# Patient Record
Sex: FEMALE | Race: WHITE | NOT HISPANIC OR LATINO | URBAN - METROPOLITAN AREA
[De-identification: names, ages, dates, MRNs, and addresses within clinical notes are randomized per-mention and may not be internally consistent; named-entity substitution may affect disease eponyms.]

---

## 2023-08-28 ENCOUNTER — EMERGENCY (EMERGENCY)
Facility: HOSPITAL | Age: 23
LOS: 1 days | Discharge: HOME | End: 2023-08-28
Attending: STUDENT IN AN ORGANIZED HEALTH CARE EDUCATION/TRAINING PROGRAM | Admitting: STUDENT IN AN ORGANIZED HEALTH CARE EDUCATION/TRAINING PROGRAM
Payer: COMMERCIAL

## 2023-08-28 VITALS
RESPIRATION RATE: 16 BRPM | WEIGHT: 164.91 LBS | TEMPERATURE: 100 F | HEART RATE: 125 BPM | OXYGEN SATURATION: 95 % | HEIGHT: 66 IN | SYSTOLIC BLOOD PRESSURE: 110 MMHG | DIASTOLIC BLOOD PRESSURE: 69 MMHG

## 2023-08-28 VITALS
DIASTOLIC BLOOD PRESSURE: 72 MMHG | HEART RATE: 95 BPM | SYSTOLIC BLOOD PRESSURE: 108 MMHG | RESPIRATION RATE: 17 BRPM | TEMPERATURE: 98 F | OXYGEN SATURATION: 98 %

## 2023-08-28 DIAGNOSIS — B27.90 INFECTIOUS MONONUCLEOSIS, UNSPECIFIED WITHOUT COMPLICATION: ICD-10-CM

## 2023-08-28 DIAGNOSIS — N39.0 URINARY TRACT INFECTION, SITE NOT SPECIFIED: ICD-10-CM

## 2023-08-28 DIAGNOSIS — Z20.822 CONTACT WITH AND (SUSPECTED) EXPOSURE TO COVID-19: ICD-10-CM

## 2023-08-28 DIAGNOSIS — R50.9 FEVER, UNSPECIFIED: ICD-10-CM

## 2023-08-28 LAB
ANION GAP SERPL CALC-SCNC: 11 MMOL/L — SIGNIFICANT CHANGE UP (ref 5–17)
ANISOCYTOSIS BLD QL: SLIGHT — SIGNIFICANT CHANGE UP
APPEARANCE UR: CLEAR — SIGNIFICANT CHANGE UP
BACTERIA # UR AUTO: PRESENT /HPF
BASOPHILS # BLD AUTO: 0.09 K/UL — SIGNIFICANT CHANGE UP (ref 0–0.2)
BASOPHILS NFR BLD AUTO: 1.7 % — SIGNIFICANT CHANGE UP (ref 0–2)
BILIRUB UR-MCNC: ABNORMAL
BUN SERPL-MCNC: 6 MG/DL — LOW (ref 7–23)
CALCIUM SERPL-MCNC: 8.9 MG/DL — SIGNIFICANT CHANGE UP (ref 8.4–10.5)
CHLORIDE SERPL-SCNC: 98 MMOL/L — SIGNIFICANT CHANGE UP (ref 96–108)
CO2 SERPL-SCNC: 26 MMOL/L — SIGNIFICANT CHANGE UP (ref 22–31)
COD CRY URNS QL: ABNORMAL /HPF
COLOR SPEC: ABNORMAL
CREAT SERPL-MCNC: 0.54 MG/DL — SIGNIFICANT CHANGE UP (ref 0.5–1.3)
DACRYOCYTES BLD QL SMEAR: SLIGHT — SIGNIFICANT CHANGE UP
DIFF PNL FLD: ABNORMAL
EGFR: 133 ML/MIN/1.73M2 — SIGNIFICANT CHANGE UP
EOSINOPHIL # BLD AUTO: 0.05 K/UL — SIGNIFICANT CHANGE UP (ref 0–0.5)
EOSINOPHIL NFR BLD AUTO: 0.9 % — SIGNIFICANT CHANGE UP (ref 0–6)
EPI CELLS # UR: ABNORMAL /HPF (ref 0–5)
GIANT PLATELETS BLD QL SMEAR: PRESENT — SIGNIFICANT CHANGE UP
GLUCOSE SERPL-MCNC: 123 MG/DL — HIGH (ref 70–99)
GLUCOSE UR QL: 100
HCT VFR BLD CALC: 42 % — SIGNIFICANT CHANGE UP (ref 34.5–45)
HETEROPH AB TITR SER AGGL: POSITIVE
HGB BLD-MCNC: 14.2 G/DL — SIGNIFICANT CHANGE UP (ref 11.5–15.5)
KETONES UR-MCNC: ABNORMAL MG/DL
LACTATE SERPL-SCNC: 1.6 MMOL/L — SIGNIFICANT CHANGE UP (ref 0.5–2)
LEUKOCYTE ESTERASE UR-ACNC: ABNORMAL
LYMPHOCYTES # BLD AUTO: 1.57 K/UL — SIGNIFICANT CHANGE UP (ref 1–3.3)
LYMPHOCYTES # BLD AUTO: 29.2 % — SIGNIFICANT CHANGE UP (ref 13–44)
MACROCYTES BLD QL: SLIGHT — SIGNIFICANT CHANGE UP
MANUAL SMEAR VERIFICATION: SIGNIFICANT CHANGE UP
MCHC RBC-ENTMCNC: 30.3 PG — SIGNIFICANT CHANGE UP (ref 27–34)
MCHC RBC-ENTMCNC: 33.8 GM/DL — SIGNIFICANT CHANGE UP (ref 32–36)
MCV RBC AUTO: 89.6 FL — SIGNIFICANT CHANGE UP (ref 80–100)
MICROCYTES BLD QL: SLIGHT — SIGNIFICANT CHANGE UP
MONOCYTES # BLD AUTO: 0.1 K/UL — SIGNIFICANT CHANGE UP (ref 0–0.9)
MONOCYTES NFR BLD AUTO: 1.8 % — LOW (ref 2–14)
NEUTROPHILS # BLD AUTO: 3.48 K/UL — SIGNIFICANT CHANGE UP (ref 1.8–7.4)
NEUTROPHILS NFR BLD AUTO: 64.6 % — SIGNIFICANT CHANGE UP (ref 43–77)
NITRITE UR-MCNC: POSITIVE
OVALOCYTES BLD QL SMEAR: SLIGHT — SIGNIFICANT CHANGE UP
PH UR: 6.5 — SIGNIFICANT CHANGE UP (ref 5–8)
PLAT MORPH BLD: ABNORMAL
PLATELET # BLD AUTO: 156 K/UL — SIGNIFICANT CHANGE UP (ref 150–400)
POIKILOCYTOSIS BLD QL AUTO: SLIGHT — SIGNIFICANT CHANGE UP
POLYCHROMASIA BLD QL SMEAR: SLIGHT — SIGNIFICANT CHANGE UP
POTASSIUM SERPL-MCNC: 3.6 MMOL/L — SIGNIFICANT CHANGE UP (ref 3.5–5.3)
POTASSIUM SERPL-SCNC: 3.6 MMOL/L — SIGNIFICANT CHANGE UP (ref 3.5–5.3)
PROT UR-MCNC: 100 MG/DL
RAPID RVP RESULT: SIGNIFICANT CHANGE UP
RBC # BLD: 4.69 M/UL — SIGNIFICANT CHANGE UP (ref 3.8–5.2)
RBC # FLD: 12.7 % — SIGNIFICANT CHANGE UP (ref 10.3–14.5)
RBC BLD AUTO: ABNORMAL
RBC CASTS # UR COMP ASSIST: < 5 /HPF — SIGNIFICANT CHANGE UP
SARS-COV-2 RNA SPEC QL NAA+PROBE: SIGNIFICANT CHANGE UP
SMUDGE CELLS # BLD: PRESENT — SIGNIFICANT CHANGE UP
SODIUM SERPL-SCNC: 135 MMOL/L — SIGNIFICANT CHANGE UP (ref 135–145)
SP GR SPEC: 1.02 — SIGNIFICANT CHANGE UP (ref 1–1.03)
SPHEROCYTES BLD QL SMEAR: SLIGHT — SIGNIFICANT CHANGE UP
UROBILINOGEN FLD QL: 4 E.U./DL
VARIANT LYMPHS # BLD: 1.8 % — SIGNIFICANT CHANGE UP (ref 0–6)
WBC # BLD: 5.39 K/UL — SIGNIFICANT CHANGE UP (ref 3.8–10.5)
WBC # FLD AUTO: 5.39 K/UL — SIGNIFICANT CHANGE UP (ref 3.8–10.5)
WBC UR QL: > 10 /HPF

## 2023-08-28 PROCEDURE — 96374 THER/PROPH/DIAG INJ IV PUSH: CPT

## 2023-08-28 PROCEDURE — 87086 URINE CULTURE/COLONY COUNT: CPT

## 2023-08-28 PROCEDURE — 96375 TX/PRO/DX INJ NEW DRUG ADDON: CPT

## 2023-08-28 PROCEDURE — 71046 X-RAY EXAM CHEST 2 VIEWS: CPT | Mod: 26

## 2023-08-28 PROCEDURE — 0225U NFCT DS DNA&RNA 21 SARSCOV2: CPT

## 2023-08-28 PROCEDURE — 85025 COMPLETE CBC W/AUTO DIFF WBC: CPT

## 2023-08-28 PROCEDURE — 36415 COLL VENOUS BLD VENIPUNCTURE: CPT

## 2023-08-28 PROCEDURE — 99284 EMERGENCY DEPT VISIT MOD MDM: CPT | Mod: 25

## 2023-08-28 PROCEDURE — 87040 BLOOD CULTURE FOR BACTERIA: CPT

## 2023-08-28 PROCEDURE — 99285 EMERGENCY DEPT VISIT HI MDM: CPT

## 2023-08-28 PROCEDURE — 83605 ASSAY OF LACTIC ACID: CPT

## 2023-08-28 PROCEDURE — 71046 X-RAY EXAM CHEST 2 VIEWS: CPT

## 2023-08-28 PROCEDURE — 81001 URINALYSIS AUTO W/SCOPE: CPT

## 2023-08-28 PROCEDURE — 86308 HETEROPHILE ANTIBODY SCREEN: CPT

## 2023-08-28 PROCEDURE — 80048 BASIC METABOLIC PNL TOTAL CA: CPT

## 2023-08-28 RX ORDER — CEFTRIAXONE 500 MG/1
1000 INJECTION, POWDER, FOR SOLUTION INTRAMUSCULAR; INTRAVENOUS ONCE
Refills: 0 | Status: COMPLETED | OUTPATIENT
Start: 2023-08-28 | End: 2023-08-28

## 2023-08-28 RX ORDER — SODIUM CHLORIDE 9 MG/ML
1000 INJECTION INTRAMUSCULAR; INTRAVENOUS; SUBCUTANEOUS ONCE
Refills: 0 | Status: COMPLETED | OUTPATIENT
Start: 2023-08-28 | End: 2023-08-28

## 2023-08-28 RX ORDER — CEFPODOXIME PROXETIL 100 MG
1 TABLET ORAL
Qty: 20 | Refills: 0
Start: 2023-08-28 | End: 2023-09-06

## 2023-08-28 RX ADMIN — CEFTRIAXONE 100 MILLIGRAM(S): 500 INJECTION, POWDER, FOR SOLUTION INTRAMUSCULAR; INTRAVENOUS at 18:48

## 2023-08-28 RX ADMIN — SODIUM CHLORIDE 1000 MILLILITER(S): 9 INJECTION INTRAMUSCULAR; INTRAVENOUS; SUBCUTANEOUS at 18:07

## 2023-08-28 NOTE — ED PROVIDER NOTE - PATIENT PORTAL LINK FT
You can access the FollowMyHealth Patient Portal offered by [facility] by registering at the following website: [email or web address]. By joining [facility] FollowMyHealth portal, you will also be able to view your health information using other applications (apps) compatible with our system.

## 2023-08-28 NOTE — ED PROVIDER NOTE - CARE PLAN
Principal Discharge DX:	Fever   1 Principal Discharge DX:	Acute UTI  Secondary Diagnosis:	Infectious mononucleosis

## 2023-08-28 NOTE — ED ADULT TRIAGE NOTE - CHIEF COMPLAINT QUOTE
pt c/o generalized fever, headache, dizzy, lower back pain, sore throat, urinary symtoms since last Wednesday. pt stated " she went to  x 2 was tested for covid, strep with negative result given antibiotics for possible uti, "

## 2023-08-28 NOTE — ED PROVIDER NOTE - NSFOLLOWUPINSTRUCTIONS_ED_ALL_ED_FT
Pt AAOx4, VSS, in NAD throughout shift.  Pt up to restroom independently throughout shift.  DG drain pulled this shift, pt tolerated well.  Pt not measuring output, RN charting as close as able.  Pt tolerating all medications and interventions well.  RN maintaining fall risk precautions throughout shift.  Pt denies pain or other need at this time; RN will continue to monitor, assess, and alter plan of care as needed until report given to oncoming night shift nurse.   Infectious Mononucleosis  Infectious mononucleosis is an infection that is caused by a virus. This illness is often called "mono." It can spread from person to person. Mono is usually not serious. It often goes away in 2–4 weeks without treatment. In rare cases, the illness can become bad and last longer.    What are the causes?  This condition is caused by the Brittney–Barr virus. This virus spreads through:  Contact with a sick person's saliva or other body fluids. This can happen through:  Kissing.  Sex.  Coughing.  Sneezing.  Sharing forks, spoons, knives, or drinking glasses with a person who is sick.  Receiving blood from a person who has mono.  Receiving an organ from a person who has mono.  What increases the risk?  You are more likely to develop this condition if:  You are 15–24 years old.  What are the signs or symptoms?  Side view of the head and neck showing normal and swollen glands.   Common symptoms include:  Sore throat.  Headache.  Being very tired (fatigued).  Pain in the muscles.  Swollen glands.  Fever.  No desire for food.  Rash.  Other symptoms include:  A liver or spleen that is larger than normal.  Feeling like you may vomit.  Vomiting.  Pain in the belly (abdomen).  How is this treated?  There is no cure for this condition. Mono usually goes away on its own with time. Treatment can help relieve symptoms and may include:  Taking medicines, including medicines to treat swelling.  Drinking plenty of fluids.  Getting a lot of rest.  Follow these instructions at home:  Medicines    Take over-the-counter and prescription medicines only as told by your doctor.  Do not take ampicillin or amoxicillin. This may cause a rash.  Do not take aspirin if you are under 18.  Activity    Rest as needed.  Do not do any of the following activities until your doctor says that they are safe for you:  Contact sports. You may need to wait at least 1 month before you play sports.  Exercise that uses a lot of energy.  Lifting heavy things.  Slowly go back to your normal activities after your fever is gone, or when your doctor says that you can. Be sure to rest when you get tired.  General instructions    Three cups showing dark yellow, yellow, and pale yellow urine.  Avoid kissing or sharing forks, spoons, knives, or drinking cups until your doctor says that you can.  Drink enough fluid to keep your pee (urine) pale yellow.  Do not drink alcohol.  If you have a sore throat:  Rinse your mouth often with salt water. To make salt water, dissolve ½–1 tsp (3–6 g) of salt in 1 cup (237 mL) of warm water.  Eat soft foods. Cold foods such as ice cream or ice pops can help your throat feel better.  Try sucking on hard candy.  Keep all follow-up visits.  How is this prevented?  Hands being washed with soap and water at sink.  Avoid contact with people who have mono. A person who has mono may not seem sick, but he or she can still spread the virus.  Avoid sharing forks, spoons, knives, drinking cups, or toothbrushes.  Wash your hands often for at least 20 seconds with soap and water. If you cannot use soap and water, use hand .  Use the inside of your elbow to cover your mouth when you cough or sneeze.  Where to find more information  Centers for Disease Control and Prevention: www.cdc.gov    Contact a doctor if:  Your fever is not gone after 10 days.  You have swelling by your jaw or neck, and the swelling does not go away after 4 weeks.  Your activity level is not back to normal after 2 months.  Your skin or the white parts of your eyes turn yellow (jaundice).  You have trouble pooping (constipation). You may have constipation if:  You poop fewer times in a week than normal.  You have a hard time pooping.  You have poop that is dry, hard, or bigger than normal.  Get help right away if:  You have very bad pain in your:  Belly.  Shoulder.  You are drooling.  You have trouble swallowing.  You have trouble breathing.  You have a stiff neck.  You have a very bad headache.  You cannot stop throwing up.  You have jerky movements that you cannot control (seizures).  You are mixed up (confused).  You have trouble with balance.  Your nose or gums start to bleed.  You have signs of not having enough water in your body (dehydration). These may include:  Weakness.  Sunken eyes.  Pale skin.  Dry mouth.  Fast breathing or heartbeat.  These symptoms may be an emergency. Get help right away. Call your local emergency services (911 in the [place]  Do not wait to see if the symptoms will go away.  Do not drive yourself to the hospital.  Summary  Infectious mononucleosis, or "mono," is an infection that is caused by a virus.  Mono is usually not serious, but some people may need to be treated for it in the hospital.  You should not play contact sports or lift heavy things until your doctor says that you can.  Wash your hands often for at least 20 seconds with soap and water. If you cannot use soap and water, use hand .  This information is not intended to replace advice given to you by your health care provider. Make sure you discuss any questions you have with your health care provider.

## 2023-08-28 NOTE — ED ADULT NURSE NOTE - NSFALLUNIVINTERV_ED_ALL_ED
Bed/Stretcher in lowest position, wheels locked, appropriate side rails in place/Call bell, personal items and telephone in reach/Instruct patient to call for assistance before getting out of bed/chair/stretcher/Non-slip footwear applied when patient is off stretcher/[place] to call system/Physically safe environment - no spills, clutter or unnecessary equipment/Purposeful proactive rounding/Room/bathroom lighting operational, light cord in reach

## 2023-08-28 NOTE — ED PROVIDER NOTE - ATTENDING APP SHARED VISIT CONTRIBUTION OF CARE
21 y/o f no pmh presents c/o 5 days of body aches, fever, sore throat, intermittent mild headache. has tested negative for covid twice. pt denies associated cough/cp/sob/abdominal pain/n/v/d/dysuria/hematuria/leg edema/rash. On exam, bp 108/72, vs otherwise wnl,   GENERAL:  Awake, alert and in NAD, non-toxic appearing  ENMT: Airway patent, no intraoral lesions/exudate, uvula midline and non-edematous, no stridor/trismus/droolilng  EYES: conjunctiva clear  CARDIAC: Regular rate, regular rhythm.  Heart sounds S1, S2, no S3, S4. No murmurs, rubs or gallops.  RESPIRATORY: Breath sounds clear and equal in bilateral anterior lung fields, no wheezes/ronchi/crackles/stridor; pt breathing and speaking comfortably with no increased WOB, no accessory mm. use, no intercostal retractions, no nasal flaring  GI: abdomen soft, non-distended, non-tender, no rebound or guarding.  SKIN: warm and dry, no rashes  PSYCH: awake, alert, calm and cooperative  EXTREMITIES: no ble edema  NEURO: gcs 15    EBV+, labs grossly wnl, ucg neg, UA shows nitrite+ uti. plan is to dc with cefpodoxime for pyelonephritis and f/u pmd, no contact sports

## 2023-08-28 NOTE — ED PROVIDER NOTE - PROGRESS NOTE DETAILS
u/a +, pt given ceftriaxone in ED, +mono on labs.  Pt feeling improvement, VS improved after IVF, will d/c, rx cefpodoxime for urine, supportive care for mono, no contact sports, will f/u pmd, return to ED if sx worsen.

## 2023-08-28 NOTE — ED PROVIDER NOTE - CLINICAL SUMMARY MEDICAL DECISION MAKING FREE TEXT BOX
23 y/o f presents c/o 5 days of intermittent fever, body aches, headache, sore throat; temp in ED 99.6, tachy at 109, nontoxic appearing on exam, no meningeal signs.  Labs, urine, viral swab, monospot sent, pt given IV fluid, had taken tylenol prior to coming to ED.  Will f/u results and reassess vitals.

## 2023-08-28 NOTE — ED PROVIDER NOTE - OBJECTIVE STATEMENT
21 y/o f no pmh presents c/o 5 days of body aches, fever, sore throat, intermittent headache.  Pt stating she went to urgent care twice, had negative strep and covid, was put on macrobid for possible UTI although hasn't had any urinary sx.  Pt stating her temp increased today to 102, took tylenol with improvement and decided to come to ED.  Pt stating when she takes tylenol her headache improves, currently with no headache, reports improvement of sore throat over the past few days as well.  Denies cough, CP, SOB, neck pain/stiffness, rash, recent travel, n/v/d, dysuria, all other ROS negative.

## 2023-08-28 NOTE — ED PROVIDER NOTE - MUSCULOSKELETAL, MLM
Spine appears normal, neck supple, +FROM, range of motion is not limited, no muscle or joint tenderness

## 2023-08-28 NOTE — ED ADULT NURSE NOTE - OBJECTIVE STATEMENT
pt c/o generalized fever, headache, dizzy, lower back pain, sore throat, urinary symtoms since last Wednesday. pt stated " she went to  x 2 was tested for covid, strep with negative result given antibiotics for possible uti, "    pt triaged as above. states symptoms started on Tuesday with sore throat, migraine, neck/back pain, fever and vaginal discharge. pt with persistent fever so went to urgent care on Thursday. pt had negative COVID/flu/strep test and was started on macrobid for possible UTI. today patient denies back/neck pain, headache, dizziness, n/v/d, urinary symptoms or sore throat. pt tmax today 102 and took 1000mg of tylenol around 1550. pt speaking in clear, complete sentences. RR easy, even, un-labored on room air

## 2023-08-28 NOTE — ED PROVIDER NOTE - NS ED ATTENDING STATEMENT MOD
This was a shared visit with the LAKHWINDER. I reviewed and verified the documentation and independently performed the documented:

## 2023-08-30 LAB
CULTURE RESULTS: SIGNIFICANT CHANGE UP
SPECIMEN SOURCE: SIGNIFICANT CHANGE UP

## 2023-09-02 LAB
CULTURE RESULTS: SIGNIFICANT CHANGE UP
CULTURE RESULTS: SIGNIFICANT CHANGE UP
SPECIMEN SOURCE: SIGNIFICANT CHANGE UP
SPECIMEN SOURCE: SIGNIFICANT CHANGE UP